# Patient Record
Sex: FEMALE | Race: WHITE | NOT HISPANIC OR LATINO | Employment: FULL TIME | URBAN - METROPOLITAN AREA
[De-identification: names, ages, dates, MRNs, and addresses within clinical notes are randomized per-mention and may not be internally consistent; named-entity substitution may affect disease eponyms.]

---

## 2022-01-02 ENCOUNTER — OFFICE VISIT (OUTPATIENT)
Dept: URGENT CARE | Facility: CLINIC | Age: 42
End: 2022-01-02
Payer: COMMERCIAL

## 2022-01-02 VITALS
HEIGHT: 63 IN | SYSTOLIC BLOOD PRESSURE: 128 MMHG | WEIGHT: 169 LBS | HEART RATE: 93 BPM | DIASTOLIC BLOOD PRESSURE: 94 MMHG | OXYGEN SATURATION: 100 % | RESPIRATION RATE: 20 BRPM | TEMPERATURE: 101 F | BODY MASS INDEX: 29.95 KG/M2

## 2022-01-02 DIAGNOSIS — R51.9 NONINTRACTABLE HEADACHE, UNSPECIFIED CHRONICITY PATTERN, UNSPECIFIED HEADACHE TYPE: ICD-10-CM

## 2022-01-02 DIAGNOSIS — U07.1 COVID-19: Primary | ICD-10-CM

## 2022-01-02 PROBLEM — E78.00 HYPERCHOLESTEROLEMIA: Status: ACTIVE | Noted: 2022-01-02

## 2022-01-02 PROBLEM — I10 ACCELERATED ESSENTIAL HYPERTENSION: Status: ACTIVE | Noted: 2022-01-02

## 2022-01-02 LAB
CTP QC/QA: YES
SARS-COV-2 RDRP RESP QL NAA+PROBE: POSITIVE

## 2022-01-02 PROCEDURE — 1160F RVW MEDS BY RX/DR IN RCRD: CPT | Mod: CPTII,S$GLB,, | Performed by: EMERGENCY MEDICINE

## 2022-01-02 PROCEDURE — 3008F BODY MASS INDEX DOCD: CPT | Mod: CPTII,S$GLB,, | Performed by: EMERGENCY MEDICINE

## 2022-01-02 PROCEDURE — 1159F MED LIST DOCD IN RCRD: CPT | Mod: CPTII,S$GLB,, | Performed by: EMERGENCY MEDICINE

## 2022-01-02 PROCEDURE — 99202 PR OFFICE/OUTPT VISIT, NEW, LEVL II, 15-29 MIN: ICD-10-PCS | Mod: S$GLB,,, | Performed by: EMERGENCY MEDICINE

## 2022-01-02 PROCEDURE — 99202 OFFICE O/P NEW SF 15 MIN: CPT | Mod: S$GLB,,, | Performed by: EMERGENCY MEDICINE

## 2022-01-02 PROCEDURE — 3008F PR BODY MASS INDEX (BMI) DOCUMENTED: ICD-10-PCS | Mod: CPTII,S$GLB,, | Performed by: EMERGENCY MEDICINE

## 2022-01-02 PROCEDURE — U0002 COVID-19 LAB TEST NON-CDC: HCPCS | Mod: QW,S$GLB,, | Performed by: EMERGENCY MEDICINE

## 2022-01-02 PROCEDURE — 3080F PR MOST RECENT DIASTOLIC BLOOD PRESSURE >= 90 MM HG: ICD-10-PCS | Mod: CPTII,S$GLB,, | Performed by: EMERGENCY MEDICINE

## 2022-01-02 PROCEDURE — 1159F PR MEDICATION LIST DOCUMENTED IN MEDICAL RECORD: ICD-10-PCS | Mod: CPTII,S$GLB,, | Performed by: EMERGENCY MEDICINE

## 2022-01-02 PROCEDURE — 3080F DIAST BP >= 90 MM HG: CPT | Mod: CPTII,S$GLB,, | Performed by: EMERGENCY MEDICINE

## 2022-01-02 PROCEDURE — U0002: ICD-10-PCS | Mod: QW,S$GLB,, | Performed by: EMERGENCY MEDICINE

## 2022-01-02 PROCEDURE — 1160F PR REVIEW ALL MEDS BY PRESCRIBER/CLIN PHARMACIST DOCUMENTED: ICD-10-PCS | Mod: CPTII,S$GLB,, | Performed by: EMERGENCY MEDICINE

## 2022-01-02 PROCEDURE — 3074F PR MOST RECENT SYSTOLIC BLOOD PRESSURE < 130 MM HG: ICD-10-PCS | Mod: CPTII,S$GLB,, | Performed by: EMERGENCY MEDICINE

## 2022-01-02 PROCEDURE — 3074F SYST BP LT 130 MM HG: CPT | Mod: CPTII,S$GLB,, | Performed by: EMERGENCY MEDICINE

## 2022-01-02 RX ORDER — DESVENLAFAXINE SUCCINATE 50 MG/1
50 TABLET, EXTENDED RELEASE ORAL DAILY
COMMUNITY

## 2022-01-02 NOTE — PATIENT INSTRUCTIONS
Use over the counter(OTC) antihistamine like claritin or zyrtec daily.  Flonase: 2 sprays per nostril 1-2 times a day.   Nasal saline drops: 2-4 drops per nostril 10-15 times a day.   Tylenol or Motrin for fever or pain per label instructions.  Consider use of OTC cough medicine like Robitussin DM.  Warm saltwater gargles to 3 times a day.  Rest and drink plenty of fluids.  Avoid OTC decongestants if you have high blood pressure. This includes multi-cold symptom preparations.    Follow up in 2-3 days with PCP if no improvement or any worsening.     You have tested positive for COVID-19 today.           ISOLATION    If you tested positive and you have no symptoms, you must isolate for 5 days starting on the day of the positive test. I         If you tested positive and have symptoms, you must isolate for 5 days starting on the day of the first symptoms,  not the day of the positive test.         This is the most important part, both the CDC and the [facility] emphasize that you do not test out of isolation.         After 5 days, if your symptoms have improved and you have not had fever on day 5, you can return to the community on day 6- NO TESTING REQUIRED!          In fact, we do not retest if you were positive in the last 90 days.         After your 5 days of isolation are completed, the CDC recommends strict mask use for the first 5 days that you come out of isolation.

## 2022-01-02 NOTE — LETTER
[id number]  [facility] [address]  Phone: [phone]          Return to Work/School    Patient: Sydney Jordan  YOB: 1980   Date: 01/02/2022     To Whom It May Concern:     Sydney Jordan was in contact with/seen in my office on 01/02/2022. COVID-19 is present in our communities across the [facility]. There is limited testing for COVID at this time, so not all patients can be tested. In this situation, your employee meets the following criteria:     Sydney Jordan has met the criteria for COVID-19 testing and has a POSITIVE result. She can return to work once they are asymptomatic for 24 hours without the use of fever reducing medications AND at least five days from the start of symptoms (or from the first positive result if they have no symptoms). May return 01/06/2022 but in a  Mask for 5 days.      If you have any questions or concerns, or if I can be of further assistance, please do not hesitate to contact me.     Sincerely,      Belem Martinez MD

## 2022-03-23 ENCOUNTER — PATIENT MESSAGE (OUTPATIENT)
Dept: RESEARCH | Facility: HOSPITAL | Age: 42
End: 2022-03-23
Payer: COMMERCIAL

## 2022-06-18 ENCOUNTER — OFFICE VISIT (OUTPATIENT)
Dept: URGENT CARE | Facility: CLINIC | Age: 42
End: 2022-06-18
Payer: COMMERCIAL

## 2022-06-18 VITALS
DIASTOLIC BLOOD PRESSURE: 82 MMHG | RESPIRATION RATE: 18 BRPM | HEART RATE: 90 BPM | BODY MASS INDEX: 31.1 KG/M2 | TEMPERATURE: 97 F | HEIGHT: 62 IN | WEIGHT: 169 LBS | SYSTOLIC BLOOD PRESSURE: 127 MMHG | OXYGEN SATURATION: 97 %

## 2022-06-18 DIAGNOSIS — R05.9 COUGH: Primary | ICD-10-CM

## 2022-06-18 DIAGNOSIS — Z20.822 EXPOSURE TO COVID-19 VIRUS: ICD-10-CM

## 2022-06-18 DIAGNOSIS — U07.1 COVID-19 VIRUS DETECTED: ICD-10-CM

## 2022-06-18 DIAGNOSIS — U07.1 COVID-19 VIRUS INFECTION: ICD-10-CM

## 2022-06-18 LAB
CTP QC/QA: YES
SARS-COV-2 RDRP RESP QL NAA+PROBE: POSITIVE

## 2022-06-18 PROCEDURE — 3079F PR MOST RECENT DIASTOLIC BLOOD PRESSURE 80-89 MM HG: ICD-10-PCS | Mod: CPTII,S$GLB,, | Performed by: NURSE PRACTITIONER

## 2022-06-18 PROCEDURE — 99214 OFFICE O/P EST MOD 30 MIN: CPT | Mod: S$GLB,,, | Performed by: NURSE PRACTITIONER

## 2022-06-18 PROCEDURE — 1160F RVW MEDS BY RX/DR IN RCRD: CPT | Mod: CPTII,S$GLB,, | Performed by: NURSE PRACTITIONER

## 2022-06-18 PROCEDURE — 3008F PR BODY MASS INDEX (BMI) DOCUMENTED: ICD-10-PCS | Mod: CPTII,S$GLB,, | Performed by: NURSE PRACTITIONER

## 2022-06-18 PROCEDURE — U0002 COVID-19 LAB TEST NON-CDC: HCPCS | Mod: QW,S$GLB,, | Performed by: NURSE PRACTITIONER

## 2022-06-18 PROCEDURE — 3079F DIAST BP 80-89 MM HG: CPT | Mod: CPTII,S$GLB,, | Performed by: NURSE PRACTITIONER

## 2022-06-18 PROCEDURE — U0002: ICD-10-PCS | Mod: QW,S$GLB,, | Performed by: NURSE PRACTITIONER

## 2022-06-18 PROCEDURE — 1159F MED LIST DOCD IN RCRD: CPT | Mod: CPTII,S$GLB,, | Performed by: NURSE PRACTITIONER

## 2022-06-18 PROCEDURE — 3074F PR MOST RECENT SYSTOLIC BLOOD PRESSURE < 130 MM HG: ICD-10-PCS | Mod: CPTII,S$GLB,, | Performed by: NURSE PRACTITIONER

## 2022-06-18 PROCEDURE — 1160F PR REVIEW ALL MEDS BY PRESCRIBER/CLIN PHARMACIST DOCUMENTED: ICD-10-PCS | Mod: CPTII,S$GLB,, | Performed by: NURSE PRACTITIONER

## 2022-06-18 PROCEDURE — 1159F PR MEDICATION LIST DOCUMENTED IN MEDICAL RECORD: ICD-10-PCS | Mod: CPTII,S$GLB,, | Performed by: NURSE PRACTITIONER

## 2022-06-18 PROCEDURE — 99214 PR OFFICE/OUTPT VISIT, EST, LEVL IV, 30-39 MIN: ICD-10-PCS | Mod: S$GLB,,, | Performed by: NURSE PRACTITIONER

## 2022-06-18 PROCEDURE — 3008F BODY MASS INDEX DOCD: CPT | Mod: CPTII,S$GLB,, | Performed by: NURSE PRACTITIONER

## 2022-06-18 PROCEDURE — 3074F SYST BP LT 130 MM HG: CPT | Mod: CPTII,S$GLB,, | Performed by: NURSE PRACTITIONER

## 2022-06-18 RX ORDER — BENZONATATE 100 MG/1
100 CAPSULE ORAL 3 TIMES DAILY PRN
Qty: 30 CAPSULE | Refills: 1 | Status: SHIPPED | OUTPATIENT
Start: 2022-06-18 | End: 2023-06-18

## 2022-06-18 NOTE — PROGRESS NOTES
"Subjective:       Patient ID: Sydney Jordan is a 42 y.o. female.    Vitals:  height is 5' 2" (1.575 m) and weight is 76.7 kg (169 lb). Her temperature is 96.8 °F (36 °C). Her blood pressure is 127/82 and her pulse is 90. Her respiration is 18 and oxygen saturation is 97%.     Chief Complaint: Cough    Pt present for cough, fever, and headaches that started yesterday. Pt states she has been exposed to covid.    Cough  This is a new problem. The current episode started yesterday. The problem has been unchanged. The problem occurs every few minutes. The cough is non-productive. Associated symptoms include chills, a fever, headaches, myalgias, postnasal drip, rhinorrhea, a sore throat and sweats. Pertinent negatives include no chest pain, ear congestion, ear pain, heartburn, hemoptysis, nasal congestion, rash, shortness of breath, weight loss or wheezing. Nothing aggravates the symptoms. Treatments tried: tylenol. The treatment provided mild relief.       Constitution: Positive for chills, sweating, fatigue and fever. Negative for generalized weakness.   HENT: Positive for congestion, postnasal drip, sinus pain, sinus pressure and sore throat. Negative for ear pain and ear discharge.    Neck: Positive for neck pain. Negative for neck stiffness.   Cardiovascular: Negative for chest pain, palpitations and sob on exertion.   Respiratory: Positive for cough. Negative for chest tightness, sputum production, bloody sputum, shortness of breath and wheezing.    Gastrointestinal: Negative for abdominal pain, nausea, vomiting, diarrhea and heartburn.   Musculoskeletal: Positive for muscle ache.   Skin: Negative for rash.   Neurological: Positive for headaches. Negative for dizziness and light-headedness.       Objective:      Physical Exam   Constitutional: She is oriented to person, place, and time. She appears well-developed. She is cooperative.  Non-toxic appearance. She does not appear ill. No distress.   HENT:   Head: " Normocephalic and atraumatic.   Ears:   Right Ear: Hearing and external ear normal.   Left Ear: Hearing and external ear normal.   Nose: Mucosal edema and rhinorrhea present. No nasal deformity. No epistaxis. Right sinus exhibits no maxillary sinus tenderness and no frontal sinus tenderness. Left sinus exhibits no maxillary sinus tenderness and no frontal sinus tenderness.   Mouth/Throat: Uvula is midline and mucous membranes are normal. Mucous membranes are moist. No trismus in the jaw. Normal dentition. No uvula swelling. Posterior oropharyngeal erythema present. No oropharyngeal exudate or posterior oropharyngeal edema. Oropharynx is clear.       Eyes: Conjunctivae and lids are normal. Pupils are equal, round, and reactive to light. No scleral icterus. Extraocular movement intact   Neck: Trachea normal and phonation normal. Neck supple. No edema present. No erythema present. No neck rigidity present.   Cardiovascular: Normal rate, regular rhythm, S1 normal, S2 normal, normal heart sounds and normal pulses. Exam reveals no decreased pulses.   Pulmonary/Chest: Effort normal and breath sounds normal. No accessory muscle usage. No respiratory distress. She has no decreased breath sounds. She has no wheezes. She has no rhonchi. She has no rales.   Abdominal: Normal appearance.   Musculoskeletal: Normal range of motion.         General: No deformity. Normal range of motion.   Lymphadenopathy:     She has no cervical adenopathy.   Neurological: She is alert and oriented to person, place, and time. She exhibits normal muscle tone. Coordination normal.   Skin: Skin is warm, dry, intact, not diaphoretic and not pale.   Psychiatric: Her speech is normal and behavior is normal. Judgment and thought content normal.   Nursing note and vitals reviewed.        Results for orders placed or performed in visit on 06/18/22   POCT COVID-19 Rapid Screening   Result Value Ref Range    POC Rapid COVID Positive (A) Negative    Quality  Control Acceptable Yes       Assessment:       1. Cough    2. Exposure to COVID-19 virus    3. COVID-19 virus infection          Plan:       Reviewed Positive Covid test with patient who verbalized understanding.  Patient informed that her symptoms are indicative of a viral illness which is treated symptomatically.  We discussed over the counter treatment for this condition. Patient educational handouts also included in discharge paperwork and given to patient who verbalized understanding and agrees with plan of care.  She denies any further questions or concerns at this time.  Patient exits exam room in no acute distress.  Cough  -     POCT COVID-19 Rapid Screening  -     benzonatate (TESSALON PERLES) 100 MG capsule; Take 1 capsule (100 mg total) by mouth 3 (three) times daily as needed for Cough.  Dispense: 30 capsule; Refill: 1    Exposure to COVID-19 virus    COVID-19 virus infection      Patient Instructions     You have tested positive for COVID-19 today.         ISOLATION    If you tested positive and you have no symptoms, you must isolate for 5 days starting on the day of the positive test. I       If you tested positive and have symptoms, you must isolate for 5 days starting on the day of the first symptoms,  not the day of the positive test.       This is the most important part, both the CDC and the [facility] emphasize that you do not test out of isolation.       After 5 days, if your symptoms have improved and you have not had fever on day 5, you can return to the community on day 6- NO TESTING REQUIRED!        In fact, we do not retest if you were positive in the last 90 days.       After your 5 days of isolation are completed, the CDC recommends strict mask use for the first 5 days that you come out of isolation.      Prevention steps for patients with confirmed COVID-19    Stay home and stay away from family members and friends. The CDC says, you can leave home after these three things have happened: 1) You  have had no fever for at least 72 hours (that is three full days of no fever without the use of medicine that reduces fevers) 2) AND other symptoms have improved (for example, when your cough or shortness of breath have improved) 3) AND at least 5 days have passed since your symptoms first appeared.    Separate yourself from other people and animals in your home.    Call ahead before visiting your doctor.    Wear a facemask.    Cover your coughs and sneezes.    Wash your hands often with soap and water; hand  can be used, too.    Avoid sharing personal household items.    Wipe down surfaces used daily.    Monitor your symptoms. Seek prompt medical attention if your illness is worsening (e.g., difficulty breathing).    Before seeking care, call your healthcare provider.    If you have a medical emergency and need to call 911, notify the dispatch personnel that you have, or are being evaluated for COVID-19. If possible, put on a facemask before emergency medical services arrive.      Contact Tracing    As one of the next steps, you will receive a call or text from the [place] Department of Health ([facility]) COVID-19 Tracing Team. See the contact information below so you know not to ignore the health departments call. It is important that you contact them back immediately so they can help.      Contact Tracer Number:  [phone]  Caller ID for most carriers: [facility]     What is contact tracing?  · Contact tracing is a process that helps identify everyone who has been in close contact with an infected person. Contact tracers let those people know they may have been exposed and guide them on next steps. Confidentiality is important for everyone; no one will be told who may have exposed them to the virus.  · Your involvement is important. The more we know about where and how this virus is spreading, the better chance we have at stopping it from spreading further.  What does exposure mean?  · Exposure  means you have been within 6 feet for more than 15 minutes with a person who has or had COVID-19.  What kind of questions do the contact tracers ask?  · A contact tracer will confirm your basic contact information including name, address, phone number, and next of kin, as well as asking about any symptoms you may have had. Theyll also ask you how you think you may have gotten sick, such as places where you may have been exposed to the virus, and people you were with. Those names will never be shared with anyone outside of that call, and will only be used to help trace and stop the spread of the virus.   I have privacy concerns. How will the state use my information?  · Your privacy about your health is important. All calls are completed using call centers that use the appropriate health privacy protection measures (HIPAA compliance), meaning that your patient information is safe. No one will ever ask you any questions related to immigration status. Your health comes first.   Do I have to participate?  · You do not have to participate, but we strongly encourage you to. Contact tracing can help us catch and control new outbreaks as theyre developing to keep your friends and family safe.   What if I dont hear from anyone?  · If you dont receive a call within 24 hours, you can call the number above right away to inquire about your status. That line is open from 8:00 am - 8:00 p.m., 7 days a week.  Contact tracing saves lives! Together, we have the power to beat this virus and keep our loved ones and neighbors safe.    For more information see CDC link below.      https://www.cdc.gov/coronavirus/2019-ncov/hcp/guidance-prevent-spread.html#precautions        Sources:  [facility], [facility]       Sincerely,     Leon Cota NP                                           Viral Syndrome  If your condition worsens or fails to improve we recommend that you receive another evaluation at the ER  "immediately or contact your PCP to discuss your concerns or return here. You must understand that you've received an urgent care treatment only and that you may be released before all your medical problems are known or treated. You the patient will arrange for follouwp care as instructed.   We discussed that your illness is viral in nature so you will treat this symptomatically.    Claritin or Zyrtec for allergy symptoms  Flonase for Nasal congestion and allergy symptoms   Tylenol or Motrin for fever, pain or sore throat  Rest and fluids are important     Cough   If your condition worsens or fails to improve we recommend that you receive another evaluation at the ER immediately or contact your PCP to discuss your concerns or return here. You must understand that you've received an urgent care treatment only and that you may be released before all your medical problems are known or treated. You the patient will arrange for follouwp care as instructed. .  Rest and fluids are important  Can use honey with mena to soothe your throat  Take prescription cough meds (pills) as prescribed; take prescription cough syrup at night as needed for cough.  Do not take both the prescribed cough pills and any other cough syrup at the same time or within 6 hours of each other.     -  Flonase (fluticasone) is a nasal spray which is available over the counter and may help with your symptoms.   -  If you have hypertension avoid using the "D" which is the decongestant.  Instead you can use Coricidin HBP for cold and cough symptoms.    -  If you just have drainage you can take plain Zyrtec, Claritin or Allegra   -  Tylenol or ibuprofen can also be used as directed for pain unless you have an allergy to them or medical condition such as stomach ulcers, kidney or liver disease or blood thinners etc for which you should not be taking these type of medications.   Please follow up with your primary care doctor or specialist in the next " 48-72hrs as needed and if no improvement  If you  smoke, please stop smoking.

## 2022-06-18 NOTE — LETTER
[address] [facility] [address] ? [place], [address] ? Phone [phone] ? Fax             Return to Work/School    Patient: Sydney Jordan  YOB: 1980   Date: 06/18/2022      To Whom It May Concern:     Sydney oJrdan was in contact with/seen in my office on 06/18/2022. COVID-19 is present in our communities across the [facility] include [facility] beds at the [facility]. Not all patients are eligible or appropriate to be tested. In this situation, your employee meets the following criteria:     Sydney Jordan has met the criteria for COVID-19 testing and has a POSITIVE result. She can return to work once they are asymptomatic for 24 hours without the use of fever reducing medications AND at least five days from the start of symptoms (or from the first positive result if they have no symptoms).      If you have any questions or concerns, or if I can be of further assistance, please do not hesitate to contact me.     Sincerely,    Leon Cota NP

## 2022-06-18 NOTE — PATIENT INSTRUCTIONS
You have tested positive for COVID-19 today.         ISOLATION    If you tested positive and you have no symptoms, you must isolate for 5 days starting on the day of the positive test. I       If you tested positive and have symptoms, you must isolate for 5 days starting on the day of the first symptoms,  not the day of the positive test.       This is the most important part, both the CDC and the [facility] emphasize that you do not test out of isolation.       After 5 days, if your symptoms have improved and you have not had fever on day 5, you can return to the community on day 6- NO TESTING REQUIRED!        In fact, we do not retest if you were positive in the last 90 days.       After your 5 days of isolation are completed, the CDC recommends strict mask use for the first 5 days that you come out of isolation.      Prevention steps for patients with confirmed COVID-19    Stay home and stay away from family members and friends. The CDC says, you can leave home after these three things have happened: 1) You have had no fever for at least 72 hours (that is three full days of no fever without the use of medicine that reduces fevers) 2) AND other symptoms have improved (for example, when your cough or shortness of breath have improved) 3) AND at least 5 days have passed since your symptoms first appeared.    Separate yourself from other people and animals in your home.    Call ahead before visiting your doctor.    Wear a facemask.    Cover your coughs and sneezes.    Wash your hands often with soap and water; hand  can be used, too.    Avoid sharing personal household items.    Wipe down surfaces used daily.    Monitor your symptoms. Seek prompt medical attention if your illness is worsening (e.g., difficulty breathing).    Before seeking care, call your healthcare provider.    If you have a medical emergency and need to call 911, notify the dispatch personnel that you have, or are being evaluated for COVID-19.  If possible, put on a facemask before emergency medical services arrive.      Contact Tracing    As one of the next steps, you will receive a call or text from the [place] Department of Health ([facility]) COVID-19 Tracing Team. See the contact information below so you know not to ignore the health departments call. It is important that you contact them back immediately so they can help.      Contact Tracer Number:  [phone]  Caller ID for most carriers: [facility]     What is contact tracing?  Contact tracing is a process that helps identify everyone who has been in close contact with an infected person. Contact tracers let those people know they may have been exposed and guide them on next steps. Confidentiality is important for everyone; no one will be told who may have exposed them to the virus.  Your involvement is important. The more we know about where and how this virus is spreading, the better chance we have at stopping it from spreading further.  What does exposure mean?  Exposure means you have been within 6 feet for more than 15 minutes with a person who has or had COVID-19.  What kind of questions do the contact tracers ask?  A contact tracer will confirm your basic contact information including name, address, phone number, and next of kin, as well as asking about any symptoms you may have had. Theyll also ask you how you think you may have gotten sick, such as places where you may have been exposed to the virus, and people you were with. Those names will never be shared with anyone outside of that call, and will only be used to help trace and stop the spread of the virus.   I have privacy concerns. How will the state use my information?  Your privacy about your health is important. All calls are completed using call centers that use the appropriate health privacy protection measures (HIPAA compliance), meaning that your patient information is safe. No one will ever ask you any questions related  to immigration status. Your health comes first.   Do I have to participate?  You do not have to participate, but we strongly encourage you to. Contact tracing can help us catch and control new outbreaks as theyre developing to keep your friends and family safe.   What if I dont hear from anyone?  If you dont receive a call within 24 hours, you can call the number above right away to inquire about your status. That line is open from 8:00 am - 8:00 p.m., 7 days a week.  Contact tracing saves lives! Together, we have the power to beat this virus and keep our loved ones and neighbors safe.    For more information see CDC link below.      https://www.cdc.gov/coronavirus/2019-ncov/hcp/guidance-prevent-spread.html#precautions        Sources:  [facility], [place] [facility] and \[facility]\""       Sincerely,     Leno Cota NP                                           Viral Syndrome  If your condition worsens or fails to improve we recommend that you receive another evaluation at the ER immediately or contact your PCP to discuss your concerns or return here. You must understand that you've received an urgent care treatment only and that you may be released before all your medical problems are known or treated. You the patient will arrange for follouwp care as instructed.   We discussed that your illness is viral in nature so you will treat this symptomatically.    Claritin or Zyrtec for allergy symptoms  Flonase for Nasal congestion and allergy symptoms   Tylenol or Motrin for fever, pain or sore throat  Rest and fluids are important     Cough   If your condition worsens or fails to improve we recommend that you receive another evaluation at the ER immediately or contact your PCP to discuss your concerns or return here. You must understand that you've received an urgent care treatment only and that you may be released before all your medical problems are known or treated. You the patient will arrange for follouwp  "care as instructed. .  Rest and fluids are important  Can use honey with mnea to soothe your throat  Take prescription cough meds (pills) as prescribed; take prescription cough syrup at night as needed for cough.  Do not take both the prescribed cough pills and any other cough syrup at the same time or within 6 hours of each other.     -  Flonase (fluticasone) is a nasal spray which is available over the counter and may help with your symptoms.   -  If you have hypertension avoid using the "D" which is the decongestant.  Instead you can use Coricidin HBP for cold and cough symptoms.    -  If you just have drainage you can take plain Zyrtec, Claritin or Allegra   -  Tylenol or ibuprofen can also be used as directed for pain unless you have an allergy to them or medical condition such as stomach ulcers, kidney or liver disease or blood thinners etc for which you should not be taking these type of medications.   Please follow up with your primary care doctor or specialist in the next 48-72hrs as needed and if no improvement  If you  smoke, please stop smoking.   "

## 2022-06-21 ENCOUNTER — TELEPHONE (OUTPATIENT)
Dept: URGENT CARE | Facility: CLINIC | Age: 42
End: 2022-06-21
Payer: COMMERCIAL